# Patient Record
Sex: FEMALE | Race: WHITE | NOT HISPANIC OR LATINO | URBAN - METROPOLITAN AREA
[De-identification: names, ages, dates, MRNs, and addresses within clinical notes are randomized per-mention and may not be internally consistent; named-entity substitution may affect disease eponyms.]

---

## 2020-12-22 ENCOUNTER — OUTPATIENT (OUTPATIENT)
Dept: OUTPATIENT SERVICES | Facility: HOSPITAL | Age: 65
LOS: 1 days | End: 2020-12-22

## 2020-12-22 ENCOUNTER — OUTPATIENT (OUTPATIENT)
Dept: OUTPATIENT SERVICES | Facility: HOSPITAL | Age: 65
LOS: 1 days | End: 2020-12-22
Payer: MEDICARE

## 2020-12-22 ENCOUNTER — TRANSCRIPTION ENCOUNTER (OUTPATIENT)
Age: 65
End: 2020-12-22

## 2020-12-22 ENCOUNTER — APPOINTMENT (OUTPATIENT)
Dept: CT IMAGING | Facility: HOSPITAL | Age: 65
End: 2020-12-22

## 2020-12-22 VITALS
HEIGHT: 63 IN | SYSTOLIC BLOOD PRESSURE: 151 MMHG | WEIGHT: 179.9 LBS | TEMPERATURE: 98 F | HEART RATE: 77 BPM | OXYGEN SATURATION: 96 % | DIASTOLIC BLOOD PRESSURE: 98 MMHG | RESPIRATION RATE: 18 BRPM

## 2020-12-22 DIAGNOSIS — S42.209A UNSPECIFIED FRACTURE OF UPPER END OF UNSPECIFIED HUMERUS, INITIAL ENCOUNTER FOR CLOSED FRACTURE: ICD-10-CM

## 2020-12-22 DIAGNOSIS — Z20.828 CONTACT WITH AND (SUSPECTED) EXPOSURE TO OTHER VIRAL COMMUNICABLE DISEASES: ICD-10-CM

## 2020-12-22 DIAGNOSIS — Z98.891 HISTORY OF UTERINE SCAR FROM PREVIOUS SURGERY: Chronic | ICD-10-CM

## 2020-12-22 DIAGNOSIS — Z98.890 OTHER SPECIFIED POSTPROCEDURAL STATES: Chronic | ICD-10-CM

## 2020-12-22 DIAGNOSIS — G47.33 OBSTRUCTIVE SLEEP APNEA (ADULT) (PEDIATRIC): ICD-10-CM

## 2020-12-22 DIAGNOSIS — S42.422A DISPLACED COMMINUTED SUPRACONDYLAR FRACTURE WITHOUT INTERCONDYLAR FRACTURE OF LEFT HUMERUS, INITIAL ENCOUNTER FOR CLOSED FRACTURE: ICD-10-CM

## 2020-12-22 DIAGNOSIS — Z29.9 ENCOUNTER FOR PROPHYLACTIC MEASURES, UNSPECIFIED: ICD-10-CM

## 2020-12-22 PROBLEM — Z00.00 ENCOUNTER FOR PREVENTIVE HEALTH EXAMINATION: Status: ACTIVE | Noted: 2020-12-22

## 2020-12-22 LAB
ANION GAP SERPL CALC-SCNC: 16 MMOL/L — SIGNIFICANT CHANGE UP (ref 5–17)
BLD GP AB SCN SERPL QL: NEGATIVE — SIGNIFICANT CHANGE UP
BUN SERPL-MCNC: 15 MG/DL — SIGNIFICANT CHANGE UP (ref 7–23)
CALCIUM SERPL-MCNC: 9.5 MG/DL — SIGNIFICANT CHANGE UP (ref 8.4–10.5)
CHLORIDE SERPL-SCNC: 100 MMOL/L — SIGNIFICANT CHANGE UP (ref 96–108)
CO2 SERPL-SCNC: 20 MMOL/L — LOW (ref 22–31)
CREAT SERPL-MCNC: 0.62 MG/DL — SIGNIFICANT CHANGE UP (ref 0.5–1.3)
GLUCOSE SERPL-MCNC: 90 MG/DL — SIGNIFICANT CHANGE UP (ref 70–99)
HCT VFR BLD CALC: 37.7 % — SIGNIFICANT CHANGE UP (ref 34.5–45)
HGB BLD-MCNC: 12.7 G/DL — SIGNIFICANT CHANGE UP (ref 11.5–15.5)
MCHC RBC-ENTMCNC: 30.5 PG — SIGNIFICANT CHANGE UP (ref 27–34)
MCHC RBC-ENTMCNC: 33.7 GM/DL — SIGNIFICANT CHANGE UP (ref 32–36)
MCV RBC AUTO: 90.4 FL — SIGNIFICANT CHANGE UP (ref 80–100)
NRBC # BLD: 0 /100 WBCS — SIGNIFICANT CHANGE UP (ref 0–0)
PLATELET # BLD AUTO: 272 K/UL — SIGNIFICANT CHANGE UP (ref 150–400)
POTASSIUM SERPL-MCNC: 3.6 MMOL/L — SIGNIFICANT CHANGE UP (ref 3.5–5.3)
POTASSIUM SERPL-SCNC: 3.6 MMOL/L — SIGNIFICANT CHANGE UP (ref 3.5–5.3)
RBC # BLD: 4.17 M/UL — SIGNIFICANT CHANGE UP (ref 3.8–5.2)
RBC # FLD: 12 % — SIGNIFICANT CHANGE UP (ref 10.3–14.5)
RH IG SCN BLD-IMP: POSITIVE — SIGNIFICANT CHANGE UP
SARS-COV-2 RNA SPEC QL NAA+PROBE: SIGNIFICANT CHANGE UP
SODIUM SERPL-SCNC: 136 MMOL/L — SIGNIFICANT CHANGE UP (ref 135–145)
WBC # BLD: 7.51 K/UL — SIGNIFICANT CHANGE UP (ref 3.8–10.5)
WBC # FLD AUTO: 7.51 K/UL — SIGNIFICANT CHANGE UP (ref 3.8–10.5)

## 2020-12-22 PROCEDURE — 76377 3D RENDER W/INTRP POSTPROCES: CPT | Mod: 26

## 2020-12-22 PROCEDURE — 73200 CT UPPER EXTREMITY W/O DYE: CPT | Mod: 26,LT

## 2020-12-22 RX ORDER — LOSARTAN POTASSIUM 100 MG/1
1 TABLET, FILM COATED ORAL
Qty: 0 | Refills: 0 | DISCHARGE

## 2020-12-22 RX ORDER — SODIUM CHLORIDE 9 MG/ML
3 INJECTION INTRAMUSCULAR; INTRAVENOUS; SUBCUTANEOUS EVERY 8 HOURS
Refills: 0 | Status: DISCONTINUED | OUTPATIENT
Start: 2020-12-23 | End: 2020-12-23

## 2020-12-22 RX ORDER — OMEPRAZOLE 10 MG/1
1 CAPSULE, DELAYED RELEASE ORAL
Qty: 0 | Refills: 0 | DISCHARGE

## 2020-12-22 RX ORDER — ATORVASTATIN CALCIUM 80 MG/1
1 TABLET, FILM COATED ORAL
Qty: 0 | Refills: 0 | DISCHARGE

## 2020-12-22 RX ORDER — CEFAZOLIN SODIUM 1 G
2000 VIAL (EA) INJECTION ONCE
Refills: 0 | Status: COMPLETED | OUTPATIENT
Start: 2020-12-23 | End: 2020-12-23

## 2020-12-22 RX ORDER — SERTRALINE 25 MG/1
1 TABLET, FILM COATED ORAL
Qty: 0 | Refills: 0 | DISCHARGE

## 2020-12-22 RX ORDER — CHLORHEXIDINE GLUCONATE 213 G/1000ML
1 SOLUTION TOPICAL ONCE
Refills: 0 | Status: DISCONTINUED | OUTPATIENT
Start: 2020-12-23 | End: 2020-12-23

## 2020-12-22 RX ORDER — LIDOCAINE HCL 20 MG/ML
0.2 VIAL (ML) INJECTION ONCE
Refills: 0 | Status: DISCONTINUED | OUTPATIENT
Start: 2020-12-23 | End: 2020-12-23

## 2020-12-22 NOTE — H&P PST ADULT - NSICDXPROBLEM_GEN_ALL_CORE_FT
PROBLEM DIAGNOSES  Problem: Displaced fracture of proximal end of humerus  Assessment and Plan: Open Reduction internal fixation Left Humerus   Pre-Op Instructions discussed   labs send   Covid done 12/22/20  medical eval on file      Problem: LINDA (obstructive sleep apnea)  Assessment and Plan: OR bopoking notified   LINDA precautions     Problem: Need for prophylactic measure  Assessment and Plan: The Caprini score indicates this patient is at risk for a VTE event (score 3-5).  Most surgical patients in this group would benefit from pharmacologic prophylaxis.  The surgical team will determine the balance between VTE risk and bleeding risk

## 2020-12-22 NOTE — H&P PST ADULT - NSICDXPASTSURGICALHX_GEN_ALL_CORE_FT
PAST SURGICAL HISTORY:  H/O colonoscopy     H/O lumpectomy left -long time ago    H/O:  section

## 2020-12-22 NOTE — H&P PST ADULT - ASSESSMENT
CAPRINI SCORE [CLOT updated 18]    AGE RELATED RISK FACTORS                                                       MOBILITY RELATED FACTORS  [ ] Age 41-60 years                                            (1 Point)                    [ ] Bed rest                                                        (1 Point)  x[ ] Age: 61-74 years                                           (2 Points)                  [ ] Plaster cast                                                   (2 Points)  [ ] Age= 75 years                                              (3 Points)                    [ ] Bed bound for more than 72 hours                 (2 Points)    DISEASE RELATED RISK FACTORS                                               GENDER SPECIFIC FACTORS  [ ] Edema in the lower extremities                       (1 Point)              [ ] Pregnancy                                                     (1 Point)  [ ] Varicose veins                                               (1 Point)                     [ ] Post-partum < 6 weeks                                   (1 Point)             [ x] BMI > 25 Kg/m2                                            (1 Point)                     [ ] Hormonal therapy  or oral contraception          (1 Point)                 [ ] Sepsis (in the previous month)                        (1 Point)               [ ] History of pregnancy complications                 (1 point)  [ ] Pneumonia or serious lung disease                                               [ ] Unexplained or recurrent                     (1 Point)           (in the previous month)                               (1 Point)  [ ] Abnormal pulmonary function test                     (1 Point)                 SURGERY RELATED RISK FACTORS  [ ] Acute myocardial infarction                              (1 Point)               [ ]  Section                                             (1 Point)  [ ] Congestive heart failure (in the previous month)  (1 Point)      [ ] Minor surgery                                                  (1 Point)   [ ] Inflammatory bowel disease                             (1 Point)               [ ] Arthroscopic surgery                                        (2 Points)  [ ] Central venous access                                      (2 Points)                [x ] General surgery lasting more than 45 minutes (2 points)  [ ] Present or previous malignancy                     (2 Points)                [ ] Elective arthroplasty                                         (5 points)    [ ] Stroke (in the previous month)                          (5 Points)                                                                                                                                                           HEMATOLOGY RELATED FACTORS                                                 TRAUMA RELATED RISK FACTORS  [ ] Prior episodes of VTE                                     (3 Points)                [ ] Fracture of the hip, pelvis, or leg                       (5 Points)  [ ] Positive family history for VTE                         (3 Points)             [ ] Acute spinal cord injury (in the previous month)  (5 Points)  [ ] Prothrombin 17641 A                                     (3 Points)               [ ] Paralysis  (less than 1 month)                             (5 Points)  [ ] Factor V Leiden                                             (3 Points)                  [ ] Multiple Trauma within 1 month                        (5 Points)  [ ] Lupus anticoagulants                                     (3 Points)                                                           [ ] Anticardiolipin antibodies                               (3 Points)                                                       [ ] High homocysteine in the blood                      (3 Points)                                             [ ] Other congenital or acquired thrombophilia      (3 Points)                                                [ ] Heparin induced thrombocytopenia                  (3 Points)                                     Total Score [   5       ]

## 2020-12-22 NOTE — H&P PST ADULT - NSANTHOSAYNRD_GEN_A_CORE
criteria/No. LINDA screening performed.  STOP BANG Legend: 0-2 = LOW Risk; 3-4 = INTERMEDIATE Risk; 5-8 = HIGH Risk

## 2020-12-22 NOTE — H&P PST ADULT - HISTORY OF PRESENT ILLNESS
66 y/o female with PMH of HTN, HLD , GERG, anxiety s/p mechanical fall in 12/20/20 (slipped on ice)  sustained left elbow/humerus fracture. No LOC . Pt followed by orthopedic scheduled for Open Reduction internal Fixation Left supracondylar Humerus on 12/23/20.    Covid test done 12/22/20

## 2020-12-22 NOTE — H&P PST ADULT - NSICDXPASTMEDICALHX_GEN_ALL_CORE_FT
PAST MEDICAL HISTORY:  Anxiety     GERD without esophagitis     Hiatal hernia     HLD (hyperlipidemia)     HTN (hypertension)     Obese

## 2020-12-23 ENCOUNTER — OUTPATIENT (OUTPATIENT)
Dept: INPATIENT UNIT | Facility: HOSPITAL | Age: 65
LOS: 1 days | End: 2020-12-23
Payer: MEDICARE

## 2020-12-23 VITALS
OXYGEN SATURATION: 95 % | TEMPERATURE: 98 F | HEART RATE: 83 BPM | DIASTOLIC BLOOD PRESSURE: 82 MMHG | RESPIRATION RATE: 18 BRPM | SYSTOLIC BLOOD PRESSURE: 120 MMHG | HEIGHT: 63 IN | WEIGHT: 179.9 LBS

## 2020-12-23 VITALS
SYSTOLIC BLOOD PRESSURE: 126 MMHG | DIASTOLIC BLOOD PRESSURE: 72 MMHG | HEART RATE: 69 BPM | RESPIRATION RATE: 16 BRPM | OXYGEN SATURATION: 98 % | TEMPERATURE: 97 F

## 2020-12-23 DIAGNOSIS — Z98.890 OTHER SPECIFIED POSTPROCEDURAL STATES: Chronic | ICD-10-CM

## 2020-12-23 DIAGNOSIS — Z98.891 HISTORY OF UTERINE SCAR FROM PREVIOUS SURGERY: Chronic | ICD-10-CM

## 2020-12-23 DIAGNOSIS — S42.422A DISPLACED COMMINUTED SUPRACONDYLAR FRACTURE WITHOUT INTERCONDYLAR FRACTURE OF LEFT HUMERUS, INITIAL ENCOUNTER FOR CLOSED FRACTURE: ICD-10-CM

## 2020-12-23 LAB — RH IG SCN BLD-IMP: POSITIVE — SIGNIFICANT CHANGE UP

## 2020-12-23 PROCEDURE — C1713: CPT

## 2020-12-23 PROCEDURE — 80048 BASIC METABOLIC PNL TOTAL CA: CPT

## 2020-12-23 PROCEDURE — 86900 BLOOD TYPING SEROLOGIC ABO: CPT

## 2020-12-23 PROCEDURE — 76377 3D RENDER W/INTRP POSTPROCES: CPT

## 2020-12-23 PROCEDURE — 76000 FLUOROSCOPY <1 HR PHYS/QHP: CPT

## 2020-12-23 PROCEDURE — 85027 COMPLETE CBC AUTOMATED: CPT

## 2020-12-23 PROCEDURE — U0003: CPT

## 2020-12-23 PROCEDURE — 86901 BLOOD TYPING SEROLOGIC RH(D): CPT

## 2020-12-23 PROCEDURE — G0463: CPT

## 2020-12-23 PROCEDURE — 73080 X-RAY EXAM OF ELBOW: CPT

## 2020-12-23 PROCEDURE — C9803: CPT

## 2020-12-23 PROCEDURE — C9399: CPT

## 2020-12-23 PROCEDURE — 24546 OPTX HUM FX W/NTRCNDYLR XTN: CPT | Mod: LT

## 2020-12-23 PROCEDURE — 73200 CT UPPER EXTREMITY W/O DYE: CPT

## 2020-12-23 PROCEDURE — 86850 RBC ANTIBODY SCREEN: CPT

## 2020-12-23 RX ORDER — ONDANSETRON 8 MG/1
4 TABLET, FILM COATED ORAL ONCE
Refills: 0 | Status: DISCONTINUED | OUTPATIENT
Start: 2020-12-23 | End: 2020-12-23

## 2020-12-23 RX ORDER — SODIUM CHLORIDE 9 MG/ML
1000 INJECTION, SOLUTION INTRAVENOUS
Refills: 0 | Status: DISCONTINUED | OUTPATIENT
Start: 2020-12-23 | End: 2020-12-23

## 2020-12-23 RX ORDER — HYDROMORPHONE HYDROCHLORIDE 2 MG/ML
0.5 INJECTION INTRAMUSCULAR; INTRAVENOUS; SUBCUTANEOUS
Refills: 0 | Status: DISCONTINUED | OUTPATIENT
Start: 2020-12-23 | End: 2020-12-23

## 2020-12-23 NOTE — ASU DISCHARGE PLAN (ADULT/PEDIATRIC) - ASU DC SPECIAL INSTRUCTIONSFT
NWB Left Upper Extremity  Keep dressing clean dry and intact  Sling for comfort  Elevate to help with swelling  Passive Range of motion as tolerated  Follow up with Dr. Grace in the office in 1-2 weeks. Call office for appointment.

## 2020-12-23 NOTE — BRIEF OPERATIVE NOTE - NSICDXBRIEFPROCEDURE_GEN_ALL_CORE_FT
PROCEDURES:  Open reduction and internal fixation (ORIF) of complex t-type intercondylar fracture of distal humerus with more than 2 articular fragments 23-Dec-2020 14:25:48  Jeyson Yousif

## 2020-12-23 NOTE — ASU DISCHARGE PLAN (ADULT/PEDIATRIC) - CALL YOUR DOCTOR IF YOU HAVE ANY OF THE FOLLOWING:
Bleeding that does not stop/Pain not relieved by Medications/Wound/Surgical Site with redness, or foul smelling discharge or pus/Numbness, tingling, color or temperature change to extremity

## 2021-01-08 RX ORDER — OMEPRAZOLE 10 MG/1
1 CAPSULE, DELAYED RELEASE ORAL
Qty: 0 | Refills: 0 | DISCHARGE

## 2021-01-08 RX ORDER — SERTRALINE 25 MG/1
1 TABLET, FILM COATED ORAL
Qty: 0 | Refills: 0 | DISCHARGE

## 2021-01-08 RX ORDER — LOSARTAN POTASSIUM 100 MG/1
1 TABLET, FILM COATED ORAL
Qty: 0 | Refills: 0 | DISCHARGE

## 2021-01-08 RX ORDER — ATORVASTATIN CALCIUM 80 MG/1
1 TABLET, FILM COATED ORAL
Qty: 0 | Refills: 0 | DISCHARGE

## 2021-01-14 NOTE — BRIEF OPERATIVE NOTE - NSICDXBRIEFOPLAUNCH_GEN_ALL_CORE
ED Attending Physician Note      Patient : Diya Prasad Age: 30 year old Sex: female   MRN: [id number] Encounter Date: 1/14/2021      History     Chief Complaint   Patient presents with   • Abdominal Pain     Patient is a 30-year-old female previous history of cervical cancer cholecystectomy and appendectomy who has recurrent abdominal pain she denies any alcohol or drug abuse.  Was seen here several days ago similar belly pain had a CT scan which showed some right-sided constipation states that she has been taking MiraLAX having bowel movements having increasing pain and burning diarrhea no history of C. difficile no fever no sick contacts no known Covid exposure no cough no URI symptoms positive nausea no vomiting denies any flank pain no hematuria no melena no recent antibiotics            IMPRESSION:  Status post cholecystectomy and appendectomy.  Low position of  the cecum in the pelvis.  Constipation in the right-sided colon.  No bowel  obstruction.  No colitis or diverticulitis.  No renal stones or  hydronephrosis.  No free fluid or free air is seen.  Allergies   Allergen Reactions   • Bentyl DIARRHEA   • Toradol HIVES       Current Discharge Medication List      Prior to Admission Medications    Details   polyethylene glycol (MIRALAX) 17 g packet Take 17 g by mouth daily for 7 days. Stir and dissolve powder in any 4 to 8 ounces of beverage, then drink.  Qty: 7 packet, Refills: 0      acetaminophen-codeine (TYLENOL NO.3) 300-30 MG per tablet TK 1 T PO Q 8 H PRF PAIN      HYDROcodone-acetaminophen (NORCO)  MG per tablet TK 1 T PO Q 4 H PRN P             Current Discharge Medication List          Past Medical History:   Diagnosis Date   • Cervical cancer (CMS/HCC)    • Small bowel obstruction (CMS/HCC)        Past Surgical History:   Procedure Laterality Date   • APPENDECTOMY     • CHOLECYSTECTOMY     • COLON SURGERY     • HYSTERECTOMY     • LAPAROSCOPY,LYSIS OF ADHESIONS  10/01/2020   • UMBILICAL HERNIA  REPAIR         No family history on file.    Social History     Tobacco Use   • Smoking status: Never Smoker   • Smokeless tobacco: Never Used   Substance Use Topics   • Alcohol use: Never     Frequency: Never   • Drug use: Never       Review of Systems   Constitutional: Negative for activity change, chills and fever.   HENT: Negative.  Negative for congestion and sore throat.    Eyes: Negative for discharge.   Respiratory: Negative for cough and wheezing.    Cardiovascular: Negative for chest pain and leg swelling.   Gastrointestinal: Positive for abdominal pain and diarrhea. Negative for nausea and vomiting.   Endocrine: Negative.    Genitourinary: Positive for dysuria and urgency.   Musculoskeletal: Negative for joint swelling.   Skin: Negative for rash.   Allergic/Immunologic: Negative.    Neurological: Negative for syncope.   Hematological: Does not bruise/bleed easily.   Psychiatric/Behavioral: Negative.    All other systems reviewed and are negative.      Physical Exam     Patient Vitals for the past 24 hrs:   BP Temp Pulse Resp SpO2 Height Weight   01/14/21 1734 114/75 -- 89 -- 97 % -- --   01/14/21 1732 -- 97.6 °F (36.4 °C) -- 16 -- 5' 2\" (1.575 m) 71.9 kg (158 lb 8.2 oz)         Physical Exam  Vitals signs (98% nl pulse ox per my interpretation,) and nursing note reviewed.   Constitutional:       General: She is not in acute distress.     Appearance: Normal appearance. She is not ill-appearing or toxic-appearing.   HENT:      Head: Normocephalic and atraumatic.      Right Ear: External ear normal.      Left Ear: External ear normal.      Nose: Nose normal.      Mouth/Throat:      Mouth: Mucous membranes are moist.      Pharynx: No oropharyngeal exudate or posterior oropharyngeal erythema.   Eyes:      Conjunctiva/sclera: Conjunctivae normal.      Pupils: Pupils are equal, round, and reactive to light.   Neck:      Musculoskeletal: Neck supple.   Cardiovascular:      Rate and Rhythm: Normal rate and regular  rhythm.      Pulses: Normal pulses.      Heart sounds: Normal heart sounds. No murmur.   Pulmonary:      Effort: Pulmonary effort is normal. No respiratory distress.      Breath sounds: Normal breath sounds. No stridor. No wheezing.   Abdominal:      General: Abdomen is flat. Bowel sounds are normal.      Palpations: Abdomen is soft. There is no mass.      Tenderness: There is no abdominal tenderness. There is no guarding or rebound.      Comments: Nondistended normal bowel sounds mild diffuse  tenderness no rebound no guarding no CVA tenderness no palpable mass   Musculoskeletal:         General: No swelling.   Skin:     General: Skin is warm.      Capillary Refill: Capillary refill takes less than 2 seconds.      Findings: No rash.   Neurological:      General: No focal deficit present.      Mental Status: She is alert and oriented to person, place, and time.   Psychiatric:         Mood and Affect: Mood normal.         Behavior: Behavior normal.         Thought Content: Thought content normal.         ED Course     Procedures    Lab Results     Results for orders placed or performed during the hospital encounter of 01/14/21   Lipase   Result Value Ref Range    Lipase 261 73 - 393 Units/L   Urinalysis & Reflex Microscopy With Culture If Indicated   Result Value Ref Range    COLOR, URINALYSIS Yellow     APPEARANCE, URINALYSIS Clear     GLUCOSE, URINALYSIS Negative Negative mg/dL    BILIRUBIN, URINALYSIS Negative Negative    KETONES, URINALYSIS Negative Negative mg/dL    SPECIFIC GRAVITY, URINALYSIS <1.005 (L) 1.005 - 1.030    OCCULT BLOOD, URINALYSIS Trace (A) Negative    PH, URINALYSIS 6.5 5.0 - 7.0    PROTEIN, URINALYSIS Negative Negative mg/dL    UROBILINOGEN, URINALYSIS 0.2 0.2, 1.0 mg/dL    NITRITE, URINALYSIS Negative Negative    LEUKOCYTE ESTERASE, URINALYSIS Negative Negative    SQUAMOUS EPITHELIAL, URINALYSIS None Seen None Seen, 1 to 5 /hpf    ERYTHROCYTES, URINALYSIS 1 to 2 None Seen, 1 to 2 /hpf     LEUKOCYTES, URINALYSIS None Seen None Seen, 1 to 5 /hpf    BACTERIA, URINALYSIS None Seen None Seen /hpf    HYALINE CASTS, URINALYSIS None Seen None Seen, 1 to 5 /lpf   Comprehensive Metabolic Panel   Result Value Ref Range    Fasting Status      Sodium 140 135 - 145 mmol/L    Potassium 3.6 3.4 - 5.1 mmol/L    Chloride 102 98 - 107 mmol/L    Carbon Dioxide 28 21 - 32 mmol/L    Anion Gap 14 10 - 20 mmol/L    Glucose 99 65 - 99 mg/dL    BUN 9 6 - 20 mg/dL    Creatinine 0.62 0.51 - 0.95 mg/dL    Glomerular Filtration Rate >90 >90 mL/min/1.73m2    BUN/ Creatinine Ratio 15 7 - 25    Calcium 9.4 8.4 - 10.2 mg/dL    Bilirubin, Total 0.1 (L) 0.2 - 1.0 mg/dL    GOT/AST 10 <=37 Units/L    GPT/ALT 25 <64 Units/L    Alkaline Phosphatase 77 45 - 117 Units/L    Albumin 4.2 3.6 - 5.1 g/dL    Protein, Total 7.9 6.4 - 8.2 g/dL    Globulin 3.7 2.0 - 4.0 g/dL    A/G Ratio 1.1 1.0 - 2.4   CBC with Automated Differential (performable only)   Result Value Ref Range    WBC 8.4 4.2 - 11.0 K/mcL    RBC 4.53 4.00 - 5.20 mil/mcL    HGB 13.4 12.0 - 15.5 g/dL    HCT 41.2 36.0 - 46.5 %    MCV 90.9 78.0 - 100.0 fl    MCH 29.6 26.0 - 34.0 pg    MCHC 32.5 32.0 - 36.5 g/dL    RDW-CV 12.1 11.0 - 15.0 %    RDW-SD 40.1 39.0 - 50.0 fL     140 - 450 K/mcL    NRBC 0 <=0 /100 WBC    Neutrophil, Percent 63 %    Lymphocytes, Percent 30 %    Mono, Percent 6 %    Eosinophils, Percent 1 %    Basophils, Percent 0 %    Immature Granulocytes 0 %    Absolute Neutrophils 5.2 1.8 - 7.7 K/mcL    Absolute Lymphocytes 2.5 1.0 - 4.8 K/mcL    Absolute Monocytes 0.5 0.3 - 0.9 K/mcL    Absolute Eosinophils  0.1 0.0 - 0.5 K/mcL    Absolute Basophils 0.0 0.0 - 0.3 K/mcL    Absolute Immmature Granulocytes 0.0 0.0 - 0.2 K/mcL   Lactic Acid, Venous   Result Value Ref Range    Lactate, Venous 0.5 0.0 - 2.0 mmol/L   Drug Abuse Screen, Urine   Result Value Ref Range    Amphetamines, Urine Negative Negative    Barbiturates, Urine Negative Negative    Benzodiazepines, Urine  Negative Negative    Cocaine/ Metabolite, Urine Negative Negative    Opiates, Urine Negative Negative    Phencyclidine, Urine Negative Negative    Cannabinoids, Urine Negative Negative   HCG POC   Result Value Ref Range    HCG, URINE - POINT OF CARE Negative Negative       EKG Results         Radiology Results     Imaging Results          CT ABDOMEN PELVIS W CONTRAST (Final result)  Result time 01/14/21 19:18:48    Final result                 Impression:      1.    Mild right hydronephrosis without obstructing radiopaque calculus.  Tapering of the distal ureter adjacent to the appendectomy clips and suture  material, possibly related to extrinsic compression/scarring. Correlation  with clinical findings is needed.     2.    Fluid-filled loops of small bowel in the mid and lower abdomen which  may be due to a mild enteritis. No bowel obstruction.     3.   Status post cholecystectomy with mild biliary ductal dilatation,  similar to the prior. No radiopaque choledocholithiasis.     4.   Additional findings as above.    Electronically Signed by: CLARENCE DALEY MD   Signed on: 1/14/2021 7:18 PM                Narrative:    PROCEDURE:  CT ABDOMEN PELVIS W CONTRAST    CLINICAL INDICATION:  Abdominal pain. History of small bowel obstruction.    TECHNIQUE:  Helical CT of the abdomen and pelvis was performed using  non-ionic intravenous contrast.  No oral contrast was administered. The mA  and/or kV was adjusted for patient size.    CONTRAST: 75 mL Omnipaque 350    COMPARISON:  Prior study dated 01/11/2021.    FINDINGS:     The liver is normal in size and morphology. No focal hepatic lesions are  identified. The portal and hepatic veins are patent. Cholecystectomy clips  are seen. There is mild biliary ductal dilatation. No radiopaque  choledocholithiasis is seen.    The spleen, pancreas, and adrenal glands are unremarkable. There is mild  right hydroureteronephrosis with tapering of the ureter at the iliac  vessel  crossing. There are adjacent surgical clips. This is more pronounced than  on the prior. No radiopaque calculi are appreciated. The left kidney  enhances normally without hydronephrosis. There is no delayed nephrogram.     The stomach is minimally distended with fluid. There is no bowel  obstruction. Fluid-filled loops of small bowel are noted in the mid and  lower abdomen. No bowel wall thickening is seen. The terminal ileum is  unremarkable. Suture material along the lateral wall of the cecum is likely  related to prior appendectomy. The cecum is displaced into the right  central pelvis. There is a small to moderate amount of stool throughout the  colon. No pneumoperitoneum or fluid collection is seen.     The bladder is mildly distended and unremarkable. The uterus is absent.  There are no large adnexal lesions.    The abdominal aorta is normal in course and caliber. The heart size is  within normal limits. No lymphadenopathy is seen. The visualized lung bases  are clear. There are bilateral L5 pars interarticularis defects without  significant listhesis. No spondylosis is seen.                                  ED Medication Orders (From admission, onward)    None          ED Course as of Jan 14 1946   u Jan 14, 2021   1836 HCG, URINE - POINT OF CARE: Negative [NL]   1836 Lipase: 261 [NL]   1836 No uti    Urinalysis & Reflex Microscopy With Culture If Indicated(!):    COLOR Yellow   CLARITY Clear   GLUCOSE(URINE) Negative   BILIRUBIN Negative   KETONES Negative   SPECIFIC GRAVITY <1.005(!)   BLOOD Trace(!)   pH 6.5   PROTEIN(URINE) Negative   UROBILINOGEN 0.2   NITRITE Negative   LEUKOCYTE ESTERASE Negative   Squamous EPI'S None Seen   ERYTHROCYTES, URINALYSIS 1 to 2   LEUKOCYTES, URINALYSIS None Seen   BACTERIA, URINALYSIS None Seen   HYALINE CASTS, URINALYSIS None Seen [NL]   1836 Creatinine: 0.62 [NL]   1836 No fever no wbc unremarkable abdominal labs   WBC: 8.4 [NL]   1836 Nl lactic   Lactic Acid  <--- Click to Launch ICDx for PreOp, PostOp and Procedure Venous: 0.5 [NL]   1922 Neg uds    [NL]   1924 IMPRESSION:     1.    Mild right hydronephrosis without obstructing radiopaque calculus.  Tapering of the distal ureter adjacent to the appendectomy clips and suture  material, possibly related to extrinsic compression/scarring. Correlation  with clinical findings is needed.      2.    Fluid-filled loops of small bowel in the mid and lower abdomen which  may be due to a mild enteritis. No bowel obstruction.      3.   Status post cholecystectomy with mild biliary ductal dilatation,  similar to the prior. No radiopaque choledocholithiasis.      4.   Additional findings as above.     Electronically Signed by: CLARENCE DALEY MD   Signed on: 1/14/2021 7:18 PM     [NL]   1924 Mild fluid filled loops no obstruction. Mild R hydro no obstruction. Possible ureteral scaring but nl cr neg ua     [NL]   1938 D/w urology will dc home with f/u     [NL]      ED Course User Index  [NL] Cecily Fuentes MD       MDM  Number of Diagnoses or Management Options  Generalized abdominal pain:   Diagnosis management comments: Patient is a 30-year-old female recently seen here for similar symptoms ultimately diagnosed with constipation states has been having bowel movements but increasing abdominal pain history of small bowel obstruction previously previous cholecystectomy appendectomy and cervical cancer patient is afebrile normotensive has nonacute abdomen no peritoneal signs multiple drug allergies.  Given IV fluids Benadryl Haldol Pepcid labs lactic urine urine drug screen CT abdomen performed possible mild colitis no obsturction. No obstructing ureteral stones possible scarring. No uti . Nl cr. D/w urology will have pt f/u       Clinical Impression     ED Diagnosis   1. Generalized abdominal pain     2. Hydronephrosis, unspecified hydronephrosis type         Disposition        Discharge no dispo time  Diya Prasad discharge to home/self care.          Cecily Fuentes MD   1/14/2021  5:48 PM                      Cecily Fuentes MD  01/14/21 1946

## 2024-03-04 NOTE — ASU DISCHARGE PLAN (ADULT/PEDIATRIC) - B. DRINK ALCOHOL, BEER, OR WINE
Spray Paint Technique: No Medical Necessity Clause: This procedure was medically necessary because the lesions that were treated were: Spray Paint Text: The liquid nitrogen was applied to the skin utilizing a spray paint frosting technique. Number Of Freeze-Thaw Cycles: 3 freeze-thaw cycles Show Applicator Variable?: Yes Medical Necessity Information: It is in your best interest to select a reason for this procedure from the list below. All of these items fulfill various CMS LCD requirements except the new and changing color options. Detail Level: Simple Post-Care Instructions: I reviewed with the patient in detail post-care instructions. Patient is to wear sunprotection, and avoid picking at any of the treated lesions. Pt may apply Vaseline to crusted or scabbing areas. Pared With?: curette Statement Selected Consent: The patient's consent was obtained including but not limited to risks of crusting, scabbing, blistering, scarring, darker or lighter pigmentary change, recurrence, incomplete removal and infection.

## 2025-01-14 NOTE — H&P PST ADULT - NSANTHSNORERD_ENT_A_CORE
Jenny, agree with those lab orders.  Please place and set up for Flora.  She will get these tomorrow.  lit  
OK for CMP, lipid, TSHR, cbc, A1c?  
Yes